# Patient Record
Sex: MALE | Race: WHITE | NOT HISPANIC OR LATINO | Employment: UNEMPLOYED | URBAN - METROPOLITAN AREA
[De-identification: names, ages, dates, MRNs, and addresses within clinical notes are randomized per-mention and may not be internally consistent; named-entity substitution may affect disease eponyms.]

---

## 2024-06-07 ENCOUNTER — APPOINTMENT (OUTPATIENT)
Dept: RADIOLOGY | Facility: CLINIC | Age: 84
End: 2024-06-07
Payer: MEDICARE

## 2024-06-07 ENCOUNTER — OFFICE VISIT (OUTPATIENT)
Dept: URGENT CARE | Facility: CLINIC | Age: 84
End: 2024-06-07
Payer: MEDICARE

## 2024-06-07 VITALS
DIASTOLIC BLOOD PRESSURE: 72 MMHG | RESPIRATION RATE: 16 BRPM | TEMPERATURE: 97.6 F | WEIGHT: 172 LBS | HEART RATE: 92 BPM | SYSTOLIC BLOOD PRESSURE: 117 MMHG | OXYGEN SATURATION: 98 %

## 2024-06-07 DIAGNOSIS — M25.561 ACUTE PAIN OF RIGHT KNEE: ICD-10-CM

## 2024-06-07 DIAGNOSIS — M17.11 PRIMARY OSTEOARTHRITIS OF RIGHT KNEE: Primary | ICD-10-CM

## 2024-06-07 PROCEDURE — 73564 X-RAY EXAM KNEE 4 OR MORE: CPT

## 2024-06-07 PROCEDURE — 99203 OFFICE O/P NEW LOW 30 MIN: CPT

## 2024-06-07 PROCEDURE — G0463 HOSPITAL OUTPT CLINIC VISIT: HCPCS

## 2024-06-07 PROCEDURE — 29505 APPLICATION LONG LEG SPLINT: CPT

## 2024-06-07 RX ORDER — ASPIRIN 81 MG/1
81 TABLET ORAL DAILY
COMMUNITY
Start: 2024-05-03 | End: 2025-05-03

## 2024-06-07 RX ORDER — METOPROLOL SUCCINATE 50 MG/1
50 TABLET, EXTENDED RELEASE ORAL DAILY
COMMUNITY
Start: 2024-02-12

## 2024-06-07 RX ORDER — DABIGATRAN ETEXILATE 150 MG/1
150 CAPSULE ORAL
COMMUNITY
Start: 2024-02-01

## 2024-06-07 RX ORDER — FINASTERIDE 5 MG/1
5 TABLET, FILM COATED ORAL DAILY
COMMUNITY
Start: 2024-02-12

## 2024-06-07 RX ORDER — DULOXETIN HYDROCHLORIDE 60 MG/1
CAPSULE, DELAYED RELEASE ORAL
COMMUNITY
Start: 2024-05-25

## 2024-06-07 RX ORDER — OMEPRAZOLE 20 MG/1
CAPSULE, DELAYED RELEASE ORAL
COMMUNITY

## 2024-06-07 RX ORDER — SENNOSIDES 8.6 MG
650 CAPSULE ORAL EVERY 6 HOURS PRN
COMMUNITY

## 2024-06-07 NOTE — PATIENT INSTRUCTIONS
Use Voltaren gel as prescribed  Tylenol for pain  Wear brace for support (Remove braces every 3 hours)    Ice 20 minutes 3-4 times per day for 3 days  Insulate the skin from the ice to prevent frostbite  Rest and Elevate  Follow up with orthopedic if symptoms do not improve    Follow up with PCP in 3-5 days.  Proceed to  ER if symptoms worsen.    If tests are performed, our office will contact you with results only if changes need to made to the care plan discussed with you at the visit. You can review your full results on [facility].  Osteoarthritis   WHAT YOU NEED TO KNOW:   Osteoarthritis (OA) occurs when cartilage (tissue that cushions a joint) wears away slowly and causes the bones to rub together. OA is a long-term condition that often affects the hands, neck, lower back, knees, and hips. OA is also called arthrosis or degenerative joint disease.  DISCHARGE INSTRUCTIONS:   Call your doctor or specialist if:   You have severe pain.    You cannot move your joint.    You have a fever.    Your joint is red and tender.    You have questions or concerns about your condition or care.    Medicines:  You may need any of the following:  Acetaminophen  decreases pain and fever. It is available without a doctor's order. Ask how much to take and how often to take it. Follow directions. Read the labels of all other medicines you are using to see if they also contain acetaminophen, or ask your doctor or pharmacist. Acetaminophen can cause liver damage if not taken correctly.    NSAIDs , such as ibuprofen, help decrease swelling, pain, and fever. This medicine is available with or without a doctor's order. NSAIDs can cause stomach bleeding or kidney problems in certain people. If you take blood thinner medicine, always ask your healthcare provider if NSAIDs are safe for you. Always read the medicine label and follow directions.    Capsaicin cream  may help decrease pain in your joint.     Prescription pain medicine   may be given. Ask your healthcare provider how to take this medicine safely. Some prescription pain medicines contain acetaminophen. Do not take other medicines that contain acetaminophen without talking to your healthcare provider. Too much acetaminophen may cause liver damage. Prescription pain medicine may cause constipation. Ask your healthcare provider how to prevent or treat constipation.     Take your medicine as directed.  Contact your healthcare provider if you think your medicine is not helping or if you have side effects. Tell your provider if you are allergic to any medicine. Keep a list of the medicines, vitamins, and herbs you take. Include the amounts, and when and why you take them. Bring the list or the pill bottles to follow-up visits. Carry your medicine list with you in case of an emergency.    Follow up with your healthcare provider as directed:  Write down your questions so you remember to ask them during your visits.  Go to physical therapy as directed:  A physical therapist teaches you exercises to help improve movement and strength, and to decrease pain in your joints. The exercises also help lower your risk for loss of function. A physical therapist may move an area with his or her hands. For example, he or she may move your leg in certain ways to treat osteoarthritis in your hip.  Manage your symptoms:   Stay active.  Physical activity may reduce your pain and improve your ability to do daily activities. Avoid activities that cause pain. Ask your healthcare provider what type of exercise would be best for you.    Maintain a healthy weight.  This helps decrease the strain on the joints in your back, hips, knees, ankles, and feet. Ask your healthcare provider what a healthy weight is for you. He or she can help you create a weight loss plan if you are overweight.    Use heat or ice on your joints as directed.  Heat and ice help decrease pain, swelling, and muscle spasms. For heat, use a  heating pad on a low setting for 20 minutes, or take a warm bath. For ice, use an ice pack, or put crushed ice in a plastic bag. Cover it with a towel before you place it on your joint. Use ice for 15 minutes every hour.    Massage the muscles around the joint.  Massage helps relieve pain and stiffness. Your healthcare provider or a physical therapist can show you how to do this. If you have hip OA, another person may need to help you massage the area.    Use a cane, crutches, or a walker if directed.  These help protect and relieve pressure on your ankle, knee, and hip joints. You may also be prescribed shoe inserts to decrease pressure in your joints.    Wear flat or low-heeled shoes.  This will help decrease pain and reduce pressure on your ankle, knee, and hip joints.    © Copyright [employer] 2023 Information is for End User's use only and may not be sold, redistributed or otherwise used for commercial purposes.  The above information is an  only. It is not intended as medical advice for individual conditions or treatments. Talk to your doctor, nurse or pharmacist before following any medical regimen to see if it is safe and effective for you.

## 2024-06-07 NOTE — PROGRESS NOTES
[facility]        NAME: Goyo Bobo is a 83 y.o. male  : 1940    MRN: [id number]  DATE: 2024  TIME: 4:41 PM    Assessment and Plan   Primary osteoarthritis of right knee [M17.11]  1. Primary osteoarthritis of right knee  Diclofenac Sodium (VOLTAREN) 1 %    Ambulatory Referral to Orthopedic Surgery    CANCELED: XR knee 3 vw right non injury        Xray of right knee interpreted by me: osteoarthritis - no acute osseous abnormality  Official radiology read pending.  Will treat with Voltaren gel and splint  Ortho referral    Patient Instructions     Use Voltaren gel as prescribed  Tylenol for pain  Wear brace for support (Remove braces every 3 hours)    Ice 20 minutes 3-4 times per day for 3 days  Insulate the skin from the ice to prevent frostbite  Rest and Elevate  Follow up with orthopedic if symptoms do not improve    Follow up with PCP in 3-5 days.  Proceed to  ER if symptoms worsen.    If tests are performed, our office will contact you with results only if changes need to made to the care plan discussed with you at the visit. You can review your full results on [facility].    Chief Complaint     Chief Complaint   Patient presents with    Knee Pain     Right knee pain; swelling, daughter states he can barely stand or put pressure on it; chronic;          History of Present Illness       83-year-old male with significant past medical history of CHF, hypertension, and recently had a Watchman procedure in May arrives for reporting right knee pain increasing over the past 2 weeks.  Patient's daughter is with him and visiting from out of town, because she picked him up from rehab today.  Daughter reports knee pain has been a chronic issue, but worse over the past 2 weeks.  Patient denies any injury to affected extremity.  Patient was recently discharged from rehab facility today with a walker, which is new for him.  Patient denies any pain in the calf area, and has full range of  motion in knee.  Patient is able to ambulate independently with walker.  Patient denies any numbness or tingling in affected extremity.  Patient has positive pulse motor sensation intact in affected extremity.    Knee Pain   The incident occurred more than 1 week ago. There was no injury mechanism. The pain is present in the right knee. The quality of the pain is described as aching. The pain is mild. Pertinent negatives include no inability to bear weight, loss of motion, loss of sensation, muscle weakness, numbness or tingling. The symptoms are aggravated by movement, weight bearing and palpation. He has tried nothing for the symptoms.       Review of Systems   Review of Systems   Constitutional:  Negative for activity change and fever.   HENT:  Negative for congestion.    Respiratory:  Negative for cough and shortness of breath.    Cardiovascular:  Negative for chest pain.   Gastrointestinal:  Negative for abdominal pain.   Genitourinary:  Negative for difficulty urinating.   Musculoskeletal:  Positive for joint swelling and myalgias. Negative for back pain.   Neurological:  Negative for dizziness, tingling, weakness, numbness and headaches.         Current Medications       Current Outpatient Medications:     acetaminophen (TYLENOL) 650 mg CR tablet, Take 650 mg by mouth every 6 (six) hours as needed, Disp: , Rfl:     aspirin (ECOTRIN LOW STRENGTH) 81 mg EC tablet, Take 81 mg by mouth daily, Disp: , Rfl:     dabigatran etexilate (PRADAXA) 150 mg capsu, Take 150 mg by mouth, Disp: , Rfl:     Diclofenac Sodium (VOLTAREN) 1 %, Apply 2 g topically 4 (four) times a day, Disp: 2 g, Rfl: 0    DULoxetine (CYMBALTA) 60 mg delayed release capsule, , Disp: , Rfl:     finasteride (PROSCAR) 5 mg tablet, Take 5 mg by mouth daily, Disp: , Rfl:     metoprolol succinate (TOPROL-XL) 50 mg 24 hr tablet, Take 50 mg by mouth daily, Disp: , Rfl:     omeprazole (PriLOSEC) 20 mg delayed release capsule, , Disp: , Rfl:     Current  Allergies     Allergies as of 06/07/2024    (No Known Allergies)            The following portions of the patient's history were reviewed and updated as appropriate: allergies, current medications, past family history, past medical history, past social history, past surgical history and problem list.     Past Medical History:   Diagnosis Date    CHF (congestive heart failure) (HCC)     Depression     Hyperlipemia     Hypertension     Prostate hyperplasia without urinary obstruction     Weakness        History reviewed. No pertinent surgical history.    History reviewed. No pertinent family history.      Medications have been verified.        Objective   /72   Pulse 92   Temp 97.6 °F (36.4 °C)   Resp 16   Wt 78 kg (172 lb)   SpO2 98%        Physical Exam     Physical Exam  Constitutional:       General: He is not in acute distress.     Appearance: Normal appearance. He is not ill-appearing.   HENT:      Head: Normocephalic.      Right Ear: External ear normal.      Left Ear: External ear normal.      Nose: Nose normal. No congestion.      Mouth/Throat:      Mouth: Mucous membranes are moist.      Pharynx: No oropharyngeal exudate or posterior oropharyngeal erythema.   Eyes:      Extraocular Movements: Extraocular movements intact.      Conjunctiva/sclera: Conjunctivae normal.      Pupils: Pupils are equal, round, and reactive to light.   Cardiovascular:      Rate and Rhythm: Normal rate and regular rhythm.      Pulses: Normal pulses.      Heart sounds: Normal heart sounds.   Pulmonary:      Effort: Pulmonary effort is normal. No respiratory distress.      Breath sounds: Normal breath sounds. No stridor. No wheezing, rhonchi or rales.   Chest:      Chest wall: No tenderness.   Musculoskeletal:         General: Swelling and tenderness present. No deformity or signs of injury. Normal range of motion.      Cervical back: Normal range of motion and neck supple.      Right knee: Swelling present. No deformity,  ecchymosis or bony tenderness. Normal range of motion. Tenderness present over the medial joint line. Normal alignment and normal patellar mobility. Normal pulse.      Instability Tests: Anterior drawer test negative. Posterior drawer test negative.      Right lower leg: No edema.      Left lower leg: No edema.        Legs:    Lymphadenopathy:      Cervical: No cervical adenopathy.   Skin:     General: Skin is warm and dry.      Capillary Refill: Capillary refill takes less than 2 seconds.   Neurological:      General: No focal deficit present.      Mental Status: He is alert and oriented to person, place, and time.   Psychiatric:         Mood and Affect: Mood normal.         Behavior: Behavior normal.     Orthopedic injury treatment    Date/Time: 6/7/2024 3:30 PM    Performed by: TONY Caro  Authorized by: TONY Caro    Patient Location:  [facility] Protocol:  Consent: Verbal consent obtained.  Risks and benefits: risks, benefits and alternatives were discussed  Consent given by: patient  Patient understanding: patient states understanding of the procedure being performed    Injury location:  Knee  Location details:  Right knee  Injury type:  Soft tissue  Neurovascular status: Neurovascularly intact    Distal perfusion: normal    Neurological function: normal    Range of motion: reduced    Immobilization:  Brace (hinged knee brace applied)  Neurovascular status: Neurovascularly intact    Distal perfusion: normal    Neurological function: normal    Range of motion: normal    Patient tolerance:  Patient tolerated the procedure well with no immediate complications

## 2024-10-31 ENCOUNTER — OFFICE VISIT (OUTPATIENT)
Dept: URGENT CARE | Facility: CLINIC | Age: 84
End: 2024-10-31
Payer: MEDICARE

## 2024-10-31 VITALS
RESPIRATION RATE: 20 BRPM | HEIGHT: 67 IN | DIASTOLIC BLOOD PRESSURE: 60 MMHG | BODY MASS INDEX: 27.31 KG/M2 | TEMPERATURE: 98.9 F | WEIGHT: 174 LBS | OXYGEN SATURATION: 99 % | HEART RATE: 78 BPM | SYSTOLIC BLOOD PRESSURE: 130 MMHG

## 2024-10-31 DIAGNOSIS — S49.91XA INJURY OF RIGHT SHOULDER, INITIAL ENCOUNTER: ICD-10-CM

## 2024-10-31 DIAGNOSIS — S69.92XA INJURY OF LEFT HAND, INITIAL ENCOUNTER: Primary | ICD-10-CM
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
## 2024-10-31 DIAGNOSIS — S69.91XA INJURY OF RIGHT HAND, INITIAL ENCOUNTER: ICD-10-CM

## 2024-10-31 PROCEDURE — 99213 OFFICE O/P EST LOW 20 MIN: CPT

## 2024-10-31 PROCEDURE — G0463 HOSPITAL OUTPT CLINIC VISIT: HCPCS

## 2024-10-31 RX ORDER — ACETAMINOPHEN 500 MG
500 TABLET ORAL EVERY 6 HOURS PRN
COMMUNITY

## 2024-10-31 RX ORDER — ROSUVASTATIN CALCIUM 20 MG/1
20 TABLET, COATED ORAL
COMMUNITY
Start: 2024-09-03 | End: 2025-09-03

## 2024-10-31 RX ORDER — OXYBUTYNIN CHLORIDE 5 MG/1
TABLET ORAL
COMMUNITY

## 2024-10-31 RX ORDER — METHENAMINE HIPPURATE 1000 MG/1
1 TABLET ORAL DAILY
COMMUNITY
Start: 2024-08-29

## 2024-10-31 RX ORDER — AMIODARONE HYDROCHLORIDE 200 MG/1
200 TABLET ORAL DAILY
COMMUNITY
Start: 2024-08-27 | End: 2025-08-27

## 2024-10-31 RX ORDER — TAMSULOSIN HYDROCHLORIDE 0.4 MG/1
1 CAPSULE ORAL DAILY
COMMUNITY
Start: 2024-08-14

## 2024-10-31 NOTE — PATIENT INSTRUCTIONS
Proceed to Emergency Department for further evaluation.     Follow up with PCP in 3-5 days.  Proceed to  ER if symptoms worsen.    If tests have been performed at [facility], our office will contact you with results if changes need to be made to the care plan discussed with you at the visit.  You can review your full results on [facility].

## 2024-10-31 NOTE — PROGRESS NOTES
[facility]        NAME: Goyo Bobo is a 84 y.o. male  : 1940    MRN: [id number]  DATE: 2024  TIME: 2:42 PM    Assessment and Plan   Fall, initial encounter [W19.XXXA]  1. Fall, initial encounter          Given age, use of blood thinner, unwitnessed fall, and multiple injuries, recommend further evaluation in the Emergency Dept. Patient & family wish to go to [facility] ([place]) at this time via private vehicle.     Patient Instructions   Proceed to Emergency Department for further evaluation.     Follow up with PCP in 3-5 days.  Proceed to  ER if symptoms worsen.    If tests have been performed at [facility], our office will contact you with results if changes need to be made to the care plan discussed with you at the visit.  [facility]    Chief Complaint     Chief Complaint   Patient presents with    Fall     Fall occurred at 0300 d/t not using walker. Right hand bruised, left hand swollen, and right shoulder pain all starting after fall this am. On blood thinner. Unknown head strike.         History of Present Illness       Patient is an 84 year old male with PMH including A-Fib, CHF, dementia, depression, GERD, HTN, hyperlipidemia, and prostate hyperplasia. PSH includes cardiac loop recorder, watchman procedure, cardiac bypass, and knee surgery. He is accompanied today by his caregiver (Ivett) who lives with him. She states that around 0300 this morning she heard a thud and found the patient laying on his bedroom floor. She reports that it was a carpeted surface and that the patient was ambulating without his walker, which he is supposed to use at all times. She assisted the patient up and back to bed at that time. She states that the patient currently is taking Pradaxa and had no LOC, however is unsure if he struck his head. His caregiver reports that she believes the patient fell into the large, heavy, wooden dresser in his  room prior to falling to the floor, as she states that the dresser in his room was moved slightly. He is reporting bruising to RIGHT hand, pain/bruising/swelling to LEFT hand, pain in RIGHT shoulder.        Fall  The accident occurred 12 to 24 hours ago. The fall occurred in unknown circumstances. He fell from a height of 3 to 5 ft (Fall from standing). He landed on Carpet. There was no blood loss. Point of impact: Unknown - fall unwitnessed. The pain is present in the right shoulder, left hand and right hand. The pain is moderate. The symptoms are aggravated by movement and pressure on injury. Pertinent negatives include no abdominal pain, bowel incontinence, fever, headaches, hearing loss, loss of consciousness, nausea, numbness, tingling, visual change or vomiting. He has tried nothing for the symptoms.       Review of Systems   Review of Systems   Constitutional:  Negative for activity change, appetite change, chills, fatigue and fever.   HENT:  Negative for congestion, ear pain, postnasal drip, rhinorrhea, sinus pressure, sinus pain and sore throat.    Eyes:  Negative for pain.   Respiratory:  Negative for cough, chest tightness and shortness of breath.    Cardiovascular:  Negative for chest pain and palpitations.   Gastrointestinal:  Negative for abdominal pain, bowel incontinence, constipation, diarrhea, nausea and vomiting.   Musculoskeletal:  Positive for arthralgias, gait problem and joint swelling. Negative for back pain.   Skin:  Positive for color change. Negative for pallor and rash.   Neurological:  Positive for weakness. Negative for dizziness, tingling, seizures, loss of consciousness, syncope, facial asymmetry, speech difficulty, light-headedness, numbness and headaches.   Hematological:  Bruises/bleeds easily.   Psychiatric/Behavioral:  Positive for confusion.         Confusion reported at baseline (per live-in caregiver).   All other systems reviewed and are negative.        Current Medications        Current Outpatient Medications:     acetaminophen (TYLENOL) 500 mg tablet, Take 500 mg by mouth every 6 (six) hours as needed for mild pain, Disp: , Rfl:     amiodarone 200 mg tablet, Take 200 mg by mouth daily, Disp: , Rfl:     dabigatran etexilate (PRADAXA) 150 mg capsu, Take 150 mg by mouth, Disp: , Rfl:     Diclofenac Sodium (VOLTAREN) 1 %, Apply 2 g topically 4 (four) times a day, Disp: 2 g, Rfl: 0    diphenhydrAMINE HCl (BENADRYL PO), Take by mouth, Disp: , Rfl:     Docusate Calcium (STOOL SOFTENER PO), Take by mouth, Disp: , Rfl:     DULoxetine (CYMBALTA) 60 mg delayed release capsule, , Disp: , Rfl:     finasteride (PROSCAR) 5 mg tablet, Take 5 mg by mouth daily, Disp: , Rfl:     methenamine hippurate (HIPREX) 1 g tablet, Take 1 g by mouth daily, Disp: , Rfl:     metoprolol succinate (TOPROL-XL) 50 mg 24 hr tablet, Take 50 mg by mouth daily, Disp: , Rfl:     Multiple Vitamin (MULTIVITAMIN PO), Take by mouth, Disp: , Rfl:     omeprazole (PriLOSEC) 20 mg delayed release capsule, , Disp: , Rfl:     rosuvastatin (CRESTOR) 20 MG tablet, Take 20 mg by mouth, Disp: , Rfl:     tamsulosin (FLOMAX) 0.4 mg, Take 1 capsule by mouth daily, Disp: , Rfl:     UNKNOWN TO PATIENT, Rest z for sleep, Disp: , Rfl:     acetaminophen (TYLENOL) 650 mg CR tablet, Take 650 mg by mouth every 6 (six) hours as needed (Patient not taking: Reported on 10/31/2024), Disp: , Rfl:     aspirin (ECOTRIN LOW STRENGTH) 81 mg EC tablet, Take 81 mg by mouth daily (Patient not taking: Reported on 10/31/2024), Disp: , Rfl:     oxybutynin (DITROPAN) 5 mg tablet, , Disp: , Rfl:     Current Allergies     Allergies as of 10/31/2024    (No Known Allergies)            The following portions of the patient's history were reviewed and updated as appropriate: allergies, current medications, past family history, past medical history, past social history, past surgical history and problem list.     Past Medical History:   Diagnosis Date    A-fib (HCC)      "CHF (congestive heart failure) (HCC)     Dementia (HCC)     Depression     GERD (gastroesophageal reflux disease)     History of transesophageal echocardiography ([facility])     Hyperlipemia     Hypertension     Prostate hyperplasia without urinary obstruction     Weakness        Past Surgical History:   Procedure Laterality Date    CARDIAC LOOP RECORDER      ICM    CARDIAC SURGERY      cardiac bypass surgery    KNEE SURGERY      OTHER SURGICAL HISTORY      WATCHMAN procedure       History reviewed. No pertinent family history.      Medications have been verified.        Objective   /60   Pulse 78   Temp 98.9 °F (37.2 °C)   Resp 20   Ht 5' 7\" (1.702 m)   Wt 78.9 kg (174 lb)   SpO2 99%   BMI 27.25 kg/m²   No LMP for male patient.       Physical Exam     Physical Exam  Vitals and nursing note reviewed.   Constitutional:       General: He is not in acute distress.     Appearance: Normal appearance. He is not ill-appearing or toxic-appearing.   HENT:      Head: Normocephalic and atraumatic.      Right Ear: External ear normal.      Left Ear: External ear normal.      Nose: Nose normal. No congestion or rhinorrhea.      Mouth/Throat:      Mouth: Mucous membranes are moist.      Pharynx: Oropharynx is clear. No oropharyngeal exudate or posterior oropharyngeal erythema.   Eyes:      General:         Right eye: No discharge.         Left eye: No discharge.      Extraocular Movements: Extraocular movements intact.      Conjunctiva/sclera: Conjunctivae normal.   Cardiovascular:      Rate and Rhythm: Normal rate and regular rhythm.      Pulses: Normal pulses.      Heart sounds: Normal heart sounds.   Pulmonary:      Effort: Pulmonary effort is normal. No respiratory distress.      Breath sounds: Normal breath sounds. No stridor. No wheezing, rhonchi or rales.   Chest:      Chest wall: No tenderness.   Abdominal:      General: Abdomen is flat.      Palpations: Abdomen is soft.   Musculoskeletal:         General: " Swelling, tenderness and signs of injury present.      Right shoulder: Tenderness present.      Left shoulder: Normal.      Right hand: Swelling and tenderness present.      Left hand: Swelling and tenderness present. Decreased range of motion. Normal strength.      Cervical back: Normal range of motion and neck supple.      Comments: RIGHT hand: bruising, swelling to dorsum of hand. No open wounds.  LEFT hand: erythema, swelling, decreased ROM. No open wounds.   Skin:     General: Skin is warm.      Capillary Refill: Capillary refill takes less than 2 seconds.      Coloration: Skin is not jaundiced or pale.      Findings: Bruising and erythema present. No lesion or rash.   Neurological:      General: No focal deficit present.      Mental Status: He is alert. Mental status is at baseline.      Motor: Weakness present.      Gait: Gait abnormal.   Psychiatric:         Mood and Affect: Mood normal.         Behavior: Behavior normal.         Thought Content: Thought content normal.         Judgment: Judgment normal.